# Patient Record
Sex: FEMALE | Race: ASIAN | NOT HISPANIC OR LATINO
[De-identification: names, ages, dates, MRNs, and addresses within clinical notes are randomized per-mention and may not be internally consistent; named-entity substitution may affect disease eponyms.]

---

## 2022-09-01 ENCOUNTER — APPOINTMENT (OUTPATIENT)
Dept: CARDIOLOGY | Facility: CLINIC | Age: 52
End: 2022-09-01

## 2022-09-01 ENCOUNTER — NON-APPOINTMENT (OUTPATIENT)
Age: 52
End: 2022-09-01

## 2022-09-01 VITALS
BODY MASS INDEX: 26.81 KG/M2 | RESPIRATION RATE: 18 BRPM | TEMPERATURE: 97.6 F | HEART RATE: 73 BPM | WEIGHT: 126 LBS | OXYGEN SATURATION: 97 % | DIASTOLIC BLOOD PRESSURE: 76 MMHG | HEIGHT: 57.48 IN | SYSTOLIC BLOOD PRESSURE: 114 MMHG

## 2022-09-01 DIAGNOSIS — Z87.19 PERSONAL HISTORY OF OTHER DISEASES OF THE DIGESTIVE SYSTEM: ICD-10-CM

## 2022-09-01 DIAGNOSIS — R07.89 OTHER CHEST PAIN: ICD-10-CM

## 2022-09-01 DIAGNOSIS — E78.5 HYPERLIPIDEMIA, UNSPECIFIED: ICD-10-CM

## 2022-09-01 PROBLEM — Z00.00 ENCOUNTER FOR PREVENTIVE HEALTH EXAMINATION: Status: ACTIVE | Noted: 2022-09-01

## 2022-09-01 PROCEDURE — 93015 CV STRESS TEST SUPVJ I&R: CPT

## 2022-09-01 PROCEDURE — 99204 OFFICE O/P NEW MOD 45 MIN: CPT | Mod: 25

## 2022-09-01 PROCEDURE — 93000 ELECTROCARDIOGRAM COMPLETE: CPT | Mod: 59

## 2022-09-01 NOTE — HISTORY OF PRESENT ILLNESS
[FreeTextEntry1] : 52 year old female with history of HLD, pre-DM, GERD who was referred for chest pain evaluation.\par \par Pt states for the past 1-2 months, she has been having pressure like discomfort in the chest. This occurs randomly and is not associated with exertion. She feels it in the mid -chest and it radiates up to her throat. At night, sometimes it radiates up and causes her to cough. She does endorse some mild shortness of breath on exertion. No palpitations, lightheadedness, LE edema, orthopnea/PND or LOC. Patient states she is awaiting CXR for evaluation.\par \par She takes calcium and one medication for cholesterol.

## 2022-09-01 NOTE — ASSESSMENT
[FreeTextEntry1] : 52 year old female with history of HLD, pre-DM, GERD who was referred for chest pain evaluation.\par \par #Chest discomfort, atypical in nature\par - baseline ECG is normal sinus rhythm with nonspecific TWI \par - I advised treadmill ECG stress for evaluation -- pt exercised for 9 minutes without ECG evidence of ischemia\par - we discussed that if TTE is also WNL, her symptoms could be related to GERD\par \par #exertional shortness of breath\par -I advised TTE to assess LV function and r/o structural heart disease for cause of shortness of breath\par -f/u CXR\par \par #Follow-up, pending echo and/or if persistent symptoms

## 2022-09-07 ENCOUNTER — APPOINTMENT (OUTPATIENT)
Dept: CARDIOLOGY | Facility: CLINIC | Age: 52
End: 2022-09-07

## 2022-09-07 VITALS — DIASTOLIC BLOOD PRESSURE: 77 MMHG | TEMPERATURE: 97.8 F | SYSTOLIC BLOOD PRESSURE: 122 MMHG

## 2022-09-07 DIAGNOSIS — R06.02 SHORTNESS OF BREATH: ICD-10-CM

## 2022-09-07 PROCEDURE — 93306 TTE W/DOPPLER COMPLETE: CPT
